# Patient Record
Sex: MALE | Race: WHITE | NOT HISPANIC OR LATINO | URBAN - METROPOLITAN AREA
[De-identification: names, ages, dates, MRNs, and addresses within clinical notes are randomized per-mention and may not be internally consistent; named-entity substitution may affect disease eponyms.]

---

## 2017-01-04 ENCOUNTER — INPATIENT (INPATIENT)
Facility: HOSPITAL | Age: 77
LOS: 5 days | Discharge: NURSING, REHAB OR LONG-TERM CARE | DRG: 123 | End: 2017-01-10
Attending: INTERNAL MEDICINE | Admitting: INTERNAL MEDICINE
Payer: MEDICARE

## 2017-01-04 VITALS
DIASTOLIC BLOOD PRESSURE: 77 MMHG | RESPIRATION RATE: 17 BRPM | TEMPERATURE: 98 F | HEART RATE: 88 BPM | SYSTOLIC BLOOD PRESSURE: 122 MMHG | OXYGEN SATURATION: 96 %

## 2017-01-04 LAB
ALBUMIN SERPL ELPH-MCNC: 3.1 G/DL — LOW (ref 3.3–5)
ALP SERPL-CCNC: 136 U/L — HIGH (ref 40–120)
ALT FLD-CCNC: 21 U/L RC — SIGNIFICANT CHANGE UP (ref 10–45)
ANION GAP SERPL CALC-SCNC: 14 MMOL/L — SIGNIFICANT CHANGE UP (ref 5–17)
APTT BLD: 34.5 SEC — SIGNIFICANT CHANGE UP (ref 27.5–37.4)
AST SERPL-CCNC: 28 U/L — SIGNIFICANT CHANGE UP (ref 10–40)
BASOPHILS # BLD AUTO: 0.1 K/UL — SIGNIFICANT CHANGE UP (ref 0–0.2)
BASOPHILS NFR BLD AUTO: 0.4 % — SIGNIFICANT CHANGE UP (ref 0–2)
BILIRUB SERPL-MCNC: 0.3 MG/DL — SIGNIFICANT CHANGE UP (ref 0.2–1.2)
BUN SERPL-MCNC: 33 MG/DL — HIGH (ref 7–23)
CALCIUM SERPL-MCNC: 8.5 MG/DL — SIGNIFICANT CHANGE UP (ref 8.4–10.5)
CHLORIDE SERPL-SCNC: 104 MMOL/L — SIGNIFICANT CHANGE UP (ref 96–108)
CO2 SERPL-SCNC: 25 MMOL/L — SIGNIFICANT CHANGE UP (ref 22–31)
CREAT SERPL-MCNC: 0.78 MG/DL — SIGNIFICANT CHANGE UP (ref 0.5–1.3)
EOSINOPHIL # BLD AUTO: 0.1 K/UL — SIGNIFICANT CHANGE UP (ref 0–0.5)
EOSINOPHIL NFR BLD AUTO: 0.4 % — SIGNIFICANT CHANGE UP (ref 0–6)
GLUCOSE SERPL-MCNC: 70 MG/DL — SIGNIFICANT CHANGE UP (ref 70–99)
HCT VFR BLD CALC: 25.6 % — LOW (ref 39–50)
HGB BLD-MCNC: 8.8 G/DL — LOW (ref 13–17)
LYMPHOCYTES # BLD AUTO: 17.5 % — SIGNIFICANT CHANGE UP (ref 13–44)
LYMPHOCYTES # BLD AUTO: 2.9 K/UL — SIGNIFICANT CHANGE UP (ref 1–3.3)
MCHC RBC-ENTMCNC: 29.2 PG — SIGNIFICANT CHANGE UP (ref 27–34)
MCHC RBC-ENTMCNC: 34.3 GM/DL — SIGNIFICANT CHANGE UP (ref 32–36)
MCV RBC AUTO: 85 FL — SIGNIFICANT CHANGE UP (ref 80–100)
MONOCYTES # BLD AUTO: 1 K/UL — HIGH (ref 0–0.9)
MONOCYTES NFR BLD AUTO: 6.1 % — SIGNIFICANT CHANGE UP (ref 2–14)
NEUTROPHILS # BLD AUTO: 12.5 K/UL — HIGH (ref 1.8–7.4)
NEUTROPHILS NFR BLD AUTO: 75.6 % — SIGNIFICANT CHANGE UP (ref 43–77)
PLATELET # BLD AUTO: 355 K/UL — SIGNIFICANT CHANGE UP (ref 150–400)
POTASSIUM SERPL-MCNC: 3.9 MMOL/L — SIGNIFICANT CHANGE UP (ref 3.5–5.3)
POTASSIUM SERPL-SCNC: 3.9 MMOL/L — SIGNIFICANT CHANGE UP (ref 3.5–5.3)
PROT SERPL-MCNC: 6.7 G/DL — SIGNIFICANT CHANGE UP (ref 6–8.3)
RBC # BLD: 3.01 M/UL — LOW (ref 4.2–5.8)
RBC # FLD: 15.2 % — HIGH (ref 10.3–14.5)
SODIUM SERPL-SCNC: 143 MMOL/L — SIGNIFICANT CHANGE UP (ref 135–145)
TROPONIN T SERPL-MCNC: <0.01 NG/ML — SIGNIFICANT CHANGE UP (ref 0–0.06)
WBC # BLD: 16.5 K/UL — HIGH (ref 3.8–10.5)
WBC # FLD AUTO: 16.5 K/UL — HIGH (ref 3.8–10.5)

## 2017-01-04 PROCEDURE — 99285 EMERGENCY DEPT VISIT HI MDM: CPT | Mod: GC

## 2017-01-04 NOTE — ED ADULT NURSE NOTE - OBJECTIVE STATEMENT
76y male c/o right eye drooping and blurry vision x 1 week. Hx of cva 30 years ago with residual left sided weakness. Patient hx gastric ulcers, had G-tube. Right PICC line. Was in rehab for dysphagia. Patient states the blurry vision has mostly cleared up but his neurologist wanted to get an MRI. No new onset of symptoms in the last week.

## 2017-01-04 NOTE — ED PROVIDER NOTE - CARE PLAN
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

## 2017-01-04 NOTE — ED PROVIDER NOTE - PMH
Acute deep vein thrombosis (DVT) of femoral vein of right lower extremity    Acute gastric ulcer with perforation    Benign prostatic hyperplasia, presence of lower urinary tract symptoms unspecified, unspecified morphology    Cerebrovascular accident (CVA), unspecified mechanism    Essential hypertension    Spinal stenosis, unspecified spinal region

## 2017-01-04 NOTE — ED PROVIDER NOTE - ATTENDING CONTRIBUTION TO CARE
76 yom pmhx recent perfed gastric ulcer months ago and [facility], presents with new right eye deviation laterally and right eyelid ptosis. no othe rweaknesses. has hx of old stroke 20 yrs ago w very mild left residual weakness. pt very well appearing, right eye deviates laterally and cannot adduct past midline the right eye. will call neuro. 76 yom pmhx recent perfed gastric ulcer with very prolonged hospital course at [facility] over last few months w severe deconditioning/inability to walk/ dysphagia w peg and just discharged relatively recently to [facility] rehab facility, presents with new right eye deviation laterally and right eyelid ptosis x5 days. no other weaknesses. has hx of old stroke 20 yrs ago w very mild left residual weakness. family states was seen by a neurologist at the rehab facility who tried few days of steroids for sxs, but sent pt in for MRI when sxs werent resolving. severity -  mod   timing - gradual    duration - x5d    location - right eye    aggravating factors - none    ROS:   constitutional - no fever, no chills  eyes - + diplopia  eent - no sore throat, no nasal congestion  cvs - no chest pain, no leg swelling  resp - no shortness of breath, no cough  gi - no abdominal pain, no vomiting, no diarrhea  gu - no dysuria, no hematuria  msk - no acute back pain, no joint swelling  skin - no rashes, no jaundice  neuro - no headache, + right eye diplopia  psych - no acute mental health issue     Physical Exam:   constitutional - well appearing, awake and alert, oriented x3  head - no external evidence of trauma  cvs - rrr, no murmurs, no peripheral edema  resp - breath sounds clear and equal bilat  gi - abdomen soft and nontender, no rigidity, guarding or rebound, bowel sounds present  msk - moving all extremities spontaneously, generalized weakness and is non ambulatory at baseline  neuro - alert and oriented x3, right eye deviated laterally and cannot adduct past midline. moderate ptosis to right eyelid. pupil reactive. no other CN deficit.  skin- no jaundice, warm and dry  psych - mood and affect wnl, no apparent risk to self or others     ? small acute stroke vs CN3 palsy in setting of likely chronic ischemic disease and underlying diabetes vs vascular lesion. seen by neuro in ED, will admit for MRI. ROSI Beavers MD

## 2017-01-04 NOTE — ED PROVIDER NOTE - PROGRESS NOTE DETAILS
Attd:  Patient signed out to me pending contact with admitting physician for CVA r/o, neurology recommending medicine admission at this time.  Admitted to hospitalist in stable condition.  Fan Casas M.D.

## 2017-01-04 NOTE — ED ADULT NURSE NOTE - ED STAT RN HANDOFF DETAILS
Report given to Jammie ORTIZ. Patient resting in stretcher in [place]. Admitted, waiting for bed. VSS

## 2017-01-05 DIAGNOSIS — E11.9 TYPE 2 DIABETES MELLITUS WITHOUT COMPLICATIONS: ICD-10-CM

## 2017-01-05 DIAGNOSIS — G52.9 CRANIAL NERVE DISORDER, UNSPECIFIED: ICD-10-CM

## 2017-01-05 DIAGNOSIS — K25.1 ACUTE GASTRIC ULCER WITH PERFORATION: ICD-10-CM

## 2017-01-05 DIAGNOSIS — Z90.89 ACQUIRED ABSENCE OF OTHER ORGANS: Chronic | ICD-10-CM

## 2017-01-05 DIAGNOSIS — R53.1 WEAKNESS: ICD-10-CM

## 2017-01-05 DIAGNOSIS — D72.829 ELEVATED WHITE BLOOD CELL COUNT, UNSPECIFIED: ICD-10-CM

## 2017-01-05 DIAGNOSIS — N40.0 BENIGN PROSTATIC HYPERPLASIA WITHOUT LOWER URINARY TRACT SYMPTOMS: ICD-10-CM

## 2017-01-05 DIAGNOSIS — I82.411 ACUTE EMBOLISM AND THROMBOSIS OF RIGHT FEMORAL VEIN: ICD-10-CM

## 2017-01-05 DIAGNOSIS — M46.20 OSTEOMYELITIS OF VERTEBRA, SITE UNSPECIFIED: ICD-10-CM

## 2017-01-05 DIAGNOSIS — K27.5 CHRONIC OR UNSPECIFIED PEPTIC ULCER, SITE UNSPECIFIED, WITH PERFORATION: Chronic | ICD-10-CM

## 2017-01-05 LAB
ANION GAP SERPL CALC-SCNC: 14 MMOL/L — SIGNIFICANT CHANGE UP (ref 5–17)
BUN SERPL-MCNC: 28 MG/DL — HIGH (ref 7–23)
CALCIUM SERPL-MCNC: 8.8 MG/DL — SIGNIFICANT CHANGE UP (ref 8.4–10.5)
CHLORIDE SERPL-SCNC: 104 MMOL/L — SIGNIFICANT CHANGE UP (ref 96–108)
CO2 SERPL-SCNC: 25 MMOL/L — SIGNIFICANT CHANGE UP (ref 22–31)
CREAT SERPL-MCNC: 0.8 MG/DL — SIGNIFICANT CHANGE UP (ref 0.5–1.3)
GLUCOSE SERPL-MCNC: 96 MG/DL — SIGNIFICANT CHANGE UP (ref 70–99)
HCT VFR BLD CALC: 28.2 % — LOW (ref 39–50)
HGB BLD-MCNC: 9.6 G/DL — LOW (ref 13–17)
INR BLD: 1.63 RATIO — HIGH (ref 0.88–1.16)
MCHC RBC-ENTMCNC: 29 PG — SIGNIFICANT CHANGE UP (ref 27–34)
MCHC RBC-ENTMCNC: 33.9 GM/DL — SIGNIFICANT CHANGE UP (ref 32–36)
MCV RBC AUTO: 85.4 FL — SIGNIFICANT CHANGE UP (ref 80–100)
PLATELET # BLD AUTO: 305 K/UL — SIGNIFICANT CHANGE UP (ref 150–400)
POTASSIUM SERPL-MCNC: 3.5 MMOL/L — SIGNIFICANT CHANGE UP (ref 3.5–5.3)
POTASSIUM SERPL-SCNC: 3.5 MMOL/L — SIGNIFICANT CHANGE UP (ref 3.5–5.3)
PROTHROM AB SERPL-ACNC: 17.8 SEC — HIGH (ref 10–13.1)
RBC # BLD: 3.31 M/UL — LOW (ref 4.2–5.8)
RBC # FLD: 15.6 % — HIGH (ref 10.3–14.5)
SODIUM SERPL-SCNC: 143 MMOL/L — SIGNIFICANT CHANGE UP (ref 135–145)
WBC # BLD: 12.8 K/UL — HIGH (ref 3.8–10.5)
WBC # FLD AUTO: 12.8 K/UL — HIGH (ref 3.8–10.5)

## 2017-01-05 PROCEDURE — 71010: CPT | Mod: 26

## 2017-01-05 PROCEDURE — 99223 1ST HOSP IP/OBS HIGH 75: CPT

## 2017-01-05 PROCEDURE — 70498 CT ANGIOGRAPHY NECK: CPT | Mod: 26

## 2017-01-05 PROCEDURE — 99222 1ST HOSP IP/OBS MODERATE 55: CPT

## 2017-01-05 PROCEDURE — 70496 CT ANGIOGRAPHY HEAD: CPT | Mod: 26

## 2017-01-05 RX ORDER — DEXTROSE 50 % IN WATER 50 %
25 SYRINGE (ML) INTRAVENOUS ONCE
Qty: 0 | Refills: 0 | Status: DISCONTINUED | OUTPATIENT
Start: 2017-01-05 | End: 2017-01-10

## 2017-01-05 RX ORDER — SIMVASTATIN 20 MG/1
20 TABLET, FILM COATED ORAL AT BEDTIME
Qty: 0 | Refills: 0 | Status: DISCONTINUED | OUTPATIENT
Start: 2017-01-05 | End: 2017-01-10

## 2017-01-05 RX ORDER — VANCOMYCIN HCL 1 G
1000 VIAL (EA) INTRAVENOUS EVERY 12 HOURS
Qty: 0 | Refills: 0 | Status: DISCONTINUED | OUTPATIENT
Start: 2017-01-05 | End: 2017-01-07

## 2017-01-05 RX ORDER — RIVAROXABAN 15 MG-20MG
20 KIT ORAL DAILY
Qty: 0 | Refills: 0 | Status: DISCONTINUED | OUTPATIENT
Start: 2017-01-05 | End: 2017-01-10

## 2017-01-05 RX ORDER — DEXTROSE 50 % IN WATER 50 %
12.5 SYRINGE (ML) INTRAVENOUS ONCE
Qty: 0 | Refills: 0 | Status: DISCONTINUED | OUTPATIENT
Start: 2017-01-05 | End: 2017-01-10

## 2017-01-05 RX ORDER — GLUCAGON INJECTION, SOLUTION 0.5 MG/.1ML
1 INJECTION, SOLUTION SUBCUTANEOUS ONCE
Qty: 0 | Refills: 0 | Status: DISCONTINUED | OUTPATIENT
Start: 2017-01-05 | End: 2017-01-10

## 2017-01-05 RX ORDER — GABAPENTIN 400 MG/1
300 CAPSULE ORAL EVERY 8 HOURS
Qty: 0 | Refills: 0 | Status: DISCONTINUED | OUTPATIENT
Start: 2017-01-05 | End: 2017-01-10

## 2017-01-05 RX ORDER — PANTOPRAZOLE SODIUM 20 MG/1
40 TABLET, DELAYED RELEASE ORAL
Qty: 0 | Refills: 0 | Status: DISCONTINUED | OUTPATIENT
Start: 2017-01-05 | End: 2017-01-10

## 2017-01-05 RX ORDER — GABAPENTIN 400 MG/1
300 CAPSULE ORAL EVERY 8 HOURS
Qty: 0 | Refills: 0 | Status: DISCONTINUED | OUTPATIENT
Start: 2017-01-05 | End: 2017-01-05

## 2017-01-05 RX ORDER — OXYCODONE HYDROCHLORIDE 5 MG/1
10 TABLET ORAL EVERY 12 HOURS
Qty: 0 | Refills: 0 | Status: DISCONTINUED | OUTPATIENT
Start: 2017-01-05 | End: 2017-01-10

## 2017-01-05 RX ORDER — SODIUM CHLORIDE 9 MG/ML
1000 INJECTION, SOLUTION INTRAVENOUS
Qty: 0 | Refills: 0 | Status: DISCONTINUED | OUTPATIENT
Start: 2017-01-05 | End: 2017-01-10

## 2017-01-05 RX ORDER — FENTANYL CITRATE 50 UG/ML
1 INJECTION INTRAVENOUS
Qty: 0 | Refills: 0 | Status: DISCONTINUED | OUTPATIENT
Start: 2017-01-05 | End: 2017-01-10

## 2017-01-05 RX ORDER — NYSTATIN CREAM 100000 [USP'U]/G
1 CREAM TOPICAL
Qty: 0 | Refills: 0 | Status: DISCONTINUED | OUTPATIENT
Start: 2017-01-05 | End: 2017-01-10

## 2017-01-05 RX ORDER — DOXAZOSIN MESYLATE 4 MG
1 TABLET ORAL AT BEDTIME
Qty: 0 | Refills: 0 | Status: DISCONTINUED | OUTPATIENT
Start: 2017-01-05 | End: 2017-01-10

## 2017-01-05 RX ORDER — MEROPENEM 1 G/30ML
2000 INJECTION INTRAVENOUS EVERY 12 HOURS
Qty: 0 | Refills: 0 | Status: DISCONTINUED | OUTPATIENT
Start: 2017-01-05 | End: 2017-01-10

## 2017-01-05 RX ORDER — TAMSULOSIN HYDROCHLORIDE 0.4 MG/1
0.4 CAPSULE ORAL AT BEDTIME
Qty: 0 | Refills: 0 | Status: DISCONTINUED | OUTPATIENT
Start: 2017-01-05 | End: 2017-01-05

## 2017-01-05 RX ORDER — PANTOPRAZOLE SODIUM 20 MG/1
40 TABLET, DELAYED RELEASE ORAL
Qty: 0 | Refills: 0 | Status: DISCONTINUED | OUTPATIENT
Start: 2017-01-05 | End: 2017-01-05

## 2017-01-05 RX ORDER — ACETAMINOPHEN 500 MG
650 TABLET ORAL EVERY 6 HOURS
Qty: 0 | Refills: 0 | Status: DISCONTINUED | OUTPATIENT
Start: 2017-01-05 | End: 2017-01-10

## 2017-01-05 RX ORDER — ONDANSETRON 8 MG/1
4 TABLET, FILM COATED ORAL EVERY 6 HOURS
Qty: 0 | Refills: 0 | Status: DISCONTINUED | OUTPATIENT
Start: 2017-01-05 | End: 2017-01-10

## 2017-01-05 RX ORDER — INSULIN LISPRO 100/ML
VIAL (ML) SUBCUTANEOUS AT BEDTIME
Qty: 0 | Refills: 0 | Status: DISCONTINUED | OUTPATIENT
Start: 2017-01-05 | End: 2017-01-10

## 2017-01-05 RX ORDER — INSULIN LISPRO 100/ML
VIAL (ML) SUBCUTANEOUS
Qty: 0 | Refills: 0 | Status: DISCONTINUED | OUTPATIENT
Start: 2017-01-05 | End: 2017-01-10

## 2017-01-05 RX ORDER — TAMSULOSIN HYDROCHLORIDE 0.4 MG/1
1 CAPSULE ORAL
Qty: 0 | Refills: 0 | COMMUNITY

## 2017-01-05 RX ORDER — DEXTROSE 50 % IN WATER 50 %
1 SYRINGE (ML) INTRAVENOUS ONCE
Qty: 0 | Refills: 0 | Status: DISCONTINUED | OUTPATIENT
Start: 2017-01-05 | End: 2017-01-10

## 2017-01-05 RX ADMIN — OXYCODONE HYDROCHLORIDE 10 MILLIGRAM(S): 5 TABLET ORAL at 16:43

## 2017-01-05 RX ADMIN — NYSTATIN CREAM 1 APPLICATION(S): 100000 CREAM TOPICAL at 22:07

## 2017-01-05 RX ADMIN — SIMVASTATIN 20 MILLIGRAM(S): 20 TABLET, FILM COATED ORAL at 22:08

## 2017-01-05 RX ADMIN — RIVAROXABAN 20 MILLIGRAM(S): KIT at 16:43

## 2017-01-05 RX ADMIN — MEROPENEM 200 MILLIGRAM(S): 1 INJECTION INTRAVENOUS at 22:06

## 2017-01-05 RX ADMIN — Medication 250 MILLIGRAM(S): at 22:07

## 2017-01-05 RX ADMIN — Medication 1 MILLIGRAM(S): at 22:08

## 2017-01-05 RX ADMIN — GABAPENTIN 300 MILLIGRAM(S): 400 CAPSULE ORAL at 22:07

## 2017-01-05 RX ADMIN — PANTOPRAZOLE SODIUM 40 MILLIGRAM(S): 20 TABLET, DELAYED RELEASE ORAL at 16:43

## 2017-01-05 NOTE — H&P ADULT. - FAMILY HISTORY
Sibling  Still living? Unknown  Family history of ischemic heart disease, Age at diagnosis: Age Unknown

## 2017-01-05 NOTE — PATIENT PROFILE ADULT. - VISION (WITH CORRECTIVE LENSES IF THE PATIENT USUALLY WEARS THEM):
Normal vision: sees adequately in most situations; can see medication labels, newsprint Partially impaired: cannot see medication labels or newsprint, but can see obstacles in path, and the surrounding layout; can count fingers at arm's length

## 2017-01-05 NOTE — H&P ADULT. - PROBLEM SELECTOR PROBLEM 4
Benign prostatic hyperplasia, presence of lower urinary tract symptoms unspecified, unspecified morphology

## 2017-01-05 NOTE — H&P ADULT. - PROBLEM SELECTOR PLAN 7
h/o osteo at [place] on IV abx  -appreciate ID consult  -repeat MRI T/L spine to better evaluate   -continue vanc/grey via picc

## 2017-01-05 NOTE — H&P ADULT. - ASSESSMENT
Mr. Leger is a 75 y/o m with PMH DM II, HTN, spinal stenosis, CVA 20 years ago with mild residual L sided weakness, recent complicated admission at [place] for perforated gastric ulcer c/b PEG tube placement, RLE DVT, osteomyelitis/diskitis on vanc/grey via PICC sent in from rehab for R lid ptosis and 3rd nerve palsy.

## 2017-01-05 NOTE — H&P ADULT. - HISTORY OF PRESENT ILLNESS
Mr. Leger is a 77 y/o m with PMH DM II, HTN, spinal stenosis, CVA 20 years ago with mild residual L sided weakness, recent complicated admission at [place] for perforated gastric ulcer c/b PEG tube placement, RLE DVT, osteomyelitis/diskitis on vanc/grey via PICC sent in from rehab for R lid ptosis and lateral gaze deviation.  Per patient for the past week he has noticed his R eye hasn't been opening.  He also initially had some decreased visual acuity and horizontal double vision which has been improving.  No headaches, pain with eye movements, no changes in speech, changes in strength.  Seen by neurologist at rehab, treated with medrol pack 12/29-1/4 without improvement sent to hospital for MRI.  Patient denies any fevers, chills, nausea, vomiting, diarrhea, dysuria, cough, chest pain, sick contacts.  Walked with a walker prior to admission to OSH but since hospitalization has been wheelchair bound and unable to walk due to leg weakness.

## 2017-01-05 NOTE — ED ADULT NURSE REASSESSMENT NOTE - NS ED NURSE REASSESS COMMENT FT1
Received pt asleep in bed but arousable, pt admitted, pt informed. safety and comfort provided, pt alert, ox3, denies complaints, inquiring about his diet, pt has peg tube in situ. Pick line intact on the right medial upper arm, right eye CHCF closed. moves all extremities, weakness on the left leg.

## 2017-01-05 NOTE — PATIENT PROFILE ADULT. - NUTRITION PROFILE
enteral or parenteral nutrition prior to admission enteral or parenteral nutrition prior to admission/pressure ulcer Stage 2 or greater

## 2017-01-05 NOTE — H&P ADULT. - PROBLEM SELECTOR PLAN 2
likely 2/2 recent steroid administration   appreciate ID consult  continue vanc/grey via PICC for osteo

## 2017-01-06 LAB
ANION GAP SERPL CALC-SCNC: 17 MMOL/L — SIGNIFICANT CHANGE UP (ref 5–17)
BASOPHILS # BLD AUTO: 0.03 K/UL — SIGNIFICANT CHANGE UP (ref 0–0.2)
BASOPHILS NFR BLD AUTO: 0.3 % — SIGNIFICANT CHANGE UP (ref 0–2)
BUN SERPL-MCNC: 33 MG/DL — HIGH (ref 7–23)
CALCIUM SERPL-MCNC: 8.8 MG/DL — SIGNIFICANT CHANGE UP (ref 8.4–10.5)
CHLORIDE SERPL-SCNC: 106 MMOL/L — SIGNIFICANT CHANGE UP (ref 96–108)
CHOLEST SERPL-MCNC: 142 MG/DL — SIGNIFICANT CHANGE UP (ref 10–199)
CO2 SERPL-SCNC: 28 MMOL/L — SIGNIFICANT CHANGE UP (ref 22–31)
CREAT SERPL-MCNC: 0.95 MG/DL — SIGNIFICANT CHANGE UP (ref 0.5–1.3)
EOSINOPHIL # BLD AUTO: 0.17 K/UL — SIGNIFICANT CHANGE UP (ref 0–0.5)
EOSINOPHIL NFR BLD AUTO: 1.8 % — SIGNIFICANT CHANGE UP (ref 0–6)
GLUCOSE SERPL-MCNC: 102 MG/DL — HIGH (ref 70–99)
HBA1C BLD-MCNC: 5.6 % — SIGNIFICANT CHANGE UP (ref 4–5.6)
HCT VFR BLD CALC: 28.3 % — LOW (ref 39–50)
HDLC SERPL-MCNC: 45 MG/DL — SIGNIFICANT CHANGE UP (ref 40–125)
HGB BLD-MCNC: 8.8 G/DL — LOW (ref 13–17)
IMM GRANULOCYTES NFR BLD AUTO: 0.7 % — SIGNIFICANT CHANGE UP (ref 0–1.5)
LIPID PNL WITH DIRECT LDL SERPL: 85 MG/DL — SIGNIFICANT CHANGE UP
LYMPHOCYTES # BLD AUTO: 1.6 K/UL — SIGNIFICANT CHANGE UP (ref 1–3.3)
LYMPHOCYTES # BLD AUTO: 16.7 % — SIGNIFICANT CHANGE UP (ref 13–44)
MAGNESIUM SERPL-MCNC: 2.5 MG/DL — SIGNIFICANT CHANGE UP (ref 1.6–2.6)
MCHC RBC-ENTMCNC: 26.9 PG — LOW (ref 27–34)
MCHC RBC-ENTMCNC: 31.1 GM/DL — LOW (ref 32–36)
MCV RBC AUTO: 86.5 FL — SIGNIFICANT CHANGE UP (ref 80–100)
MONOCYTES # BLD AUTO: 0.94 K/UL — HIGH (ref 0–0.9)
MONOCYTES NFR BLD AUTO: 9.8 % — SIGNIFICANT CHANGE UP (ref 2–14)
NEUTROPHILS # BLD AUTO: 6.77 K/UL — SIGNIFICANT CHANGE UP (ref 1.8–7.4)
NEUTROPHILS NFR BLD AUTO: 70.7 % — SIGNIFICANT CHANGE UP (ref 43–77)
PHOSPHATE SERPL-MCNC: 5.8 MG/DL — HIGH (ref 2.5–4.5)
PLATELET # BLD AUTO: 308 K/UL — SIGNIFICANT CHANGE UP (ref 150–400)
POTASSIUM SERPL-MCNC: 4 MMOL/L — SIGNIFICANT CHANGE UP (ref 3.5–5.3)
POTASSIUM SERPL-SCNC: 4 MMOL/L — SIGNIFICANT CHANGE UP (ref 3.5–5.3)
RBC # BLD: 3.27 M/UL — LOW (ref 4.2–5.8)
RBC # FLD: 17.2 % — HIGH (ref 10.3–14.5)
SODIUM SERPL-SCNC: 151 MMOL/L — HIGH (ref 135–145)
TOTAL CHOLESTEROL/HDL RATIO MEASUREMENT: 3.2 RATIO — LOW (ref 3.4–9.6)
TRIGL SERPL-MCNC: 60 MG/DL — SIGNIFICANT CHANGE UP (ref 10–149)
TSH SERPL-MCNC: 3.14 UU/ML — SIGNIFICANT CHANGE UP (ref 0.27–4.2)
WBC # BLD: 9.58 K/UL — SIGNIFICANT CHANGE UP (ref 3.8–10.5)
WBC # FLD AUTO: 9.58 K/UL — SIGNIFICANT CHANGE UP (ref 3.8–10.5)

## 2017-01-06 PROCEDURE — 72147 MRI CHEST SPINE W/DYE: CPT | Mod: 26

## 2017-01-06 PROCEDURE — 99232 SBSQ HOSP IP/OBS MODERATE 35: CPT

## 2017-01-06 PROCEDURE — 70553 MRI BRAIN STEM W/O & W/DYE: CPT | Mod: 26

## 2017-01-06 PROCEDURE — 72149 MRI LUMBAR SPINE W/DYE: CPT | Mod: 26

## 2017-01-06 RX ORDER — ASCORBIC ACID 60 MG
500 TABLET,CHEWABLE ORAL
Qty: 0 | Refills: 0 | Status: DISCONTINUED | OUTPATIENT
Start: 2017-01-06 | End: 2017-01-06

## 2017-01-06 RX ORDER — ASCORBIC ACID 60 MG
500 TABLET,CHEWABLE ORAL DAILY
Qty: 0 | Refills: 0 | Status: DISCONTINUED | OUTPATIENT
Start: 2017-01-06 | End: 2017-01-10

## 2017-01-06 RX ORDER — ALTEPLASE 100 MG
2 KIT INTRAVENOUS ONCE
Qty: 0 | Refills: 0 | Status: COMPLETED | OUTPATIENT
Start: 2017-01-06 | End: 2017-01-06

## 2017-01-06 RX ADMIN — GABAPENTIN 300 MILLIGRAM(S): 400 CAPSULE ORAL at 06:10

## 2017-01-06 RX ADMIN — RIVAROXABAN 20 MILLIGRAM(S): KIT at 11:38

## 2017-01-06 RX ADMIN — Medication 250 MILLIGRAM(S): at 21:19

## 2017-01-06 RX ADMIN — NYSTATIN CREAM 1 APPLICATION(S): 100000 CREAM TOPICAL at 17:19

## 2017-01-06 RX ADMIN — Medication 1 MILLIGRAM(S): at 21:19

## 2017-01-06 RX ADMIN — NYSTATIN CREAM 1 APPLICATION(S): 100000 CREAM TOPICAL at 06:09

## 2017-01-06 RX ADMIN — OXYCODONE HYDROCHLORIDE 10 MILLIGRAM(S): 5 TABLET ORAL at 22:48

## 2017-01-06 RX ADMIN — GABAPENTIN 300 MILLIGRAM(S): 400 CAPSULE ORAL at 16:31

## 2017-01-06 RX ADMIN — ALTEPLASE 2 MILLIGRAM(S): KIT at 11:13

## 2017-01-06 RX ADMIN — PANTOPRAZOLE SODIUM 40 MILLIGRAM(S): 20 TABLET, DELAYED RELEASE ORAL at 06:09

## 2017-01-06 RX ADMIN — Medication 500 MILLIGRAM(S): at 22:45

## 2017-01-06 RX ADMIN — MEROPENEM 200 MILLIGRAM(S): 1 INJECTION INTRAVENOUS at 11:34

## 2017-01-06 RX ADMIN — Medication 250 MILLIGRAM(S): at 11:37

## 2017-01-06 RX ADMIN — MEROPENEM 200 MILLIGRAM(S): 1 INJECTION INTRAVENOUS at 22:46

## 2017-01-06 RX ADMIN — OXYCODONE HYDROCHLORIDE 10 MILLIGRAM(S): 5 TABLET ORAL at 11:37

## 2017-01-06 RX ADMIN — OXYCODONE HYDROCHLORIDE 10 MILLIGRAM(S): 5 TABLET ORAL at 23:48

## 2017-01-06 RX ADMIN — GABAPENTIN 300 MILLIGRAM(S): 400 CAPSULE ORAL at 22:49

## 2017-01-06 RX ADMIN — Medication 1: at 08:16

## 2017-01-06 RX ADMIN — OXYCODONE HYDROCHLORIDE 10 MILLIGRAM(S): 5 TABLET ORAL at 12:07

## 2017-01-06 RX ADMIN — FENTANYL CITRATE 1 PATCH: 50 INJECTION INTRAVENOUS at 18:41

## 2017-01-06 RX ADMIN — SIMVASTATIN 20 MILLIGRAM(S): 20 TABLET, FILM COATED ORAL at 21:19

## 2017-01-06 NOTE — DIETITIAN INITIAL EVALUATION ADULT. - PHYSICAL APPEARANCE
underweight/Nutrition physical focused exam: Severe fat.muscle wasting at temporals, clavicles, deltoids, scapulas, ribs, triceps, calfs.

## 2017-01-06 NOTE — DISCHARGE NOTE ADULT - PROVIDER TOKENS
LEONEL:'[id number]',LEONEL:'[id number]' TOKEN:'[id number]:'[id number]:'[id number]'

## 2017-01-06 NOTE — DISCHARGE NOTE ADULT - CARE PLAN
Principal Discharge DX:	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, right  Goal:	R cranial nerve palsy improves  Instructions for follow-up, activity and diet:	possibly ischemic  Follow up with Neurology and Ophthalmology  Secondary Diagnosis:	Discitis of lumbar region  Instructions for follow-up, activity and diet:	complete antibiiotics until 1/16/17  Follow up with neurosurgery  Follow up with ID specilaist Dr Lutz  Secondary Diagnosis:	Dysphagia  Instructions for follow-up, activity and diet:	NPO with therapeutic trials of honey thick liquids by speech /swallow therapist  Secondary Diagnosis:	Hypernatremia  Instructions for follow-up, activity and diet:	Continue Free water 250 cc via PEG q6 hourly  check BMP in 2 days  Secondary Diagnosis:	Scrotal hernia  Instructions for follow-up, activity and diet:	Follow up with Urologist for hernia repair  Secondary Diagnosis:	Pressure ulcer, stage II  Instructions for follow-up, activity and diet:	sacral stg 2 pressure ulcer, continue barrier cream  Secondary Diagnosis:	PEG (percutaneous endoscopic gastrostomy) status  Instructions for follow-up, activity and diet:	continue PEG feeds Principal Discharge DX:	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, right  Goal:	R cranial nerve palsy improves  Instructions for follow-up, activity and diet:	possibly ischemic  Follow up with Neurology and Ophthalmology  Secondary Diagnosis:	Discitis of lumbar region  Instructions for follow-up, activity and diet:	complete antibiotics until 1/16/17  Follow up with neurosurgery  Follow up with ID specilaist Dr Lutz  Secondary Diagnosis:	Dysphagia  Instructions for follow-up, activity and diet:	NPO with therapeutic trials of honey thick liquids by speech /swallow therapist  Secondary Diagnosis:	Hypernatremia  Instructions for follow-up, activity and diet:	Continue Free water 250 cc via PEG q6 hourly  check BMP in 2 days  Secondary Diagnosis:	Scrotal hernia  Instructions for follow-up, activity and diet:	Follow up with Urologist for hernia repair  Secondary Diagnosis:	Pressure ulcer, stage II  Instructions for follow-up, activity and diet:	sacral stg 2 pressure ulcer, continue barrier cream  Secondary Diagnosis:	PEG (percutaneous endoscopic gastrostomy) status  Instructions for follow-up, activity and diet:	continue PEG feeds

## 2017-01-06 NOTE — DISCHARGE NOTE ADULT - CARE PROVIDERS DIRECT ADDRESSES
,DirectAddress_Unknown,[email or web address],DirectAddress_Unknown ,DirectAddress_Unknown,[email or web address],DirectAddress_Unknown

## 2017-01-06 NOTE — DIETITIAN INITIAL EVALUATION ADULT. - OTHER INFO
Nutrition consult received for tube feeding. Pt reports tolerating Jevity 1.5 @85ml/al62jwr well while at rehab. Pt denies GI distress, states last BM was a few days ago. Pt is requesting to have a similar feeding schedule as he folllows a rehab. Pt denies any history of DM, however per chart Pt has history of DM, finger sticks are monitored 3xdaiyl at rehab. will discuss with NP. Pt takes Vit C, MVI, and Vit D PTA. NKFA

## 2017-01-06 NOTE — DISCHARGE NOTE ADULT - NS AS DC FOLLOWUP STROKE INST
Influenza vaccination (VIS Pub Date: August 19, 2014)/Smoking Cessation/Stroke (includes: TIA/SAH/ICH/Ischemic Stroke) Influenza vaccination (VIS Pub Date: August 19, 2014)/Smoking Cessation Coumadin/Warfarin

## 2017-01-06 NOTE — DISCHARGE NOTE ADULT - PATIENT PORTAL LINK FT
“You can access the FollowHealth Patient Portal, offered by [facility], by registering with the following website: [email or web address]”

## 2017-01-06 NOTE — DIETITIAN INITIAL EVALUATION ADULT. - NS AS NUTRI INTERV ENTERAL NUTRITION
Rate/Volume/Recommend change EN to Jevity 1.2 @85ml/coe91unr starting at 4pm. Goal rate provides: 1836kcals and 84gm protein (34.2kcals/kg and 1.58gm pro./kg; based on stated Wt: 53.5kg) Discussed with NP.

## 2017-01-06 NOTE — DISCHARGE NOTE ADULT - MEDICATION SUMMARY - MEDICATIONS TO TAKE
I will START or STAY ON the medications listed below when I get home from the hospital:    fentaNYL 12 mcg/hr transdermal film, extended release  -- 1 patch by transdermal patch every 72 hours  -- Indication: For Pain    oxyCODONE 10 mg oral tablet  -- 1 tab(s) by mouth every 12 hours, As needed, pain  -- Indication: For Pain    Flomax 0.4 mg oral capsule  -- 1 cap(s) by mouth once a day  -- Indication: For BPH    rivaroxaban 20 mg oral tablet  -- 1 tab(s) by mouth once a day  -- Indication: For Deep venous thrombosis    gabapentin 250 mg/5 mL oral solution  -- 6 milliliter(s) by mouth every 8 hours  -- Indication: For Pain    insulin lispro 100 units/mL subcutaneous solution  --  subcutaneous once a day (at bedtime); 0 Unit(s) if Glucose 0 - 250  1 Unit(s) if Glucose 251 - 300  2 Unit(s) if Glucose 301 - 350  3 Unit(s) if Glucose 351 - 400  4 Unit(s) if Glucose Greater Than 400  -- Indication: For Diabetes    insulin lispro 100 units/mL subcutaneous solution  --  subcutaneous 3 times a day (before meals); 1 Unit(s) if Glucose 151 - 200  2 Unit(s) if Glucose 201 - 250  3 Unit(s) if Glucose 251 - 300  4 Unit(s) if Glucose 301 - 350  5 Unit(s) if Glucose 351 - 400  6 Unit(s) if Glucose Greater Than 400  -- Indication: For Diabetes    simvastatin 20 mg oral tablet  -- 1 tab(s) by mouth once a day (at bedtime)  -- Indication: For Hyperlipidemia    meropenem 1000 mg intravenous injection  -- 2000 milligram(s) intravenous every 12 hours  until 1/16/17  -- Indication: For Discitis of lumbar region    nystatin 100,000 units/g topical powder  -- 1 application on skin 2 times a day  -- Indication: For Dermatitis    vancomycin 750 mg intravenous injection  -- 750 milligram(s) intravenous every 12 hours  until 1/16/17  -- Indication: For Discitis of lumbar region    PriLOSEC 40 mg oral delayed release capsule  -- 1 cap(s) by gastrostomy tube once a day  -- Indication: For Gerd    ascorbic acid 500 mg oral tablet  -- 1 tab(s) by gastrostomy tube once a day  -- Indication: For Supplement

## 2017-01-06 NOTE — DIETITIAN INITIAL EVALUATION ADULT. - PROBLEM SELECTOR PLAN 7
h/o osteo at [place] on IV abx  -appreciate ID consult  -repeat MRI T/L spine to better evaluate   -continue vanc/grey via picc

## 2017-01-06 NOTE — DISCHARGE NOTE ADULT - PLAN OF CARE
R cranial nerve palsy improves possibly ischemic  Follow up with Neurology and Ophthalmology complete antibiiotics until 1/16/17  Follow up with neurosurgery  Follow up with ID specilaist Dr Lutz NPO with therapeutic trials of honey thick liquids by speech /swallow therapist Continue Free water 250 cc via PEG q6 hourly  check BMP in 2 days Follow up with Urologist for hernia repair sacral stg 2 pressure ulcer, continue barrier cream continue PEG feeds complete antibiotics until 1/16/17  Follow up with neurosurgery  Follow up with ID specilaist Dr Lutz

## 2017-01-06 NOTE — DIETITIAN INITIAL EVALUATION ADULT. - ENERGY NEEDS
Ht: 5'2", Wt:118lbs (stated, as no recorded Wt), BMI: 21.5kg/m2, IBW: 1118lbs(+/-10%), 100 %IBW  Pertinent information: Pt admitted from rehab  for AMS, with 3rd never pesly. S/p CT Head and Brain MRI; pending results. Pt with history of OM, PEG and perforated gastric ulcer.? history of DM. Pending swallow eval.    No Edema. Stage 2 sacral pressure ulcer

## 2017-01-06 NOTE — DIETITIAN INITIAL EVALUATION ADULT. - NS AS NUTRI INTERV COLLABORAT
Collaboration with other nutrition professionals/Please check Hgba1c to determine Pt's DM status. Malnutrition sticker placed in chart. RD remains available to monitor Ed tolerance, wt labs.

## 2017-01-06 NOTE — DISCHARGE NOTE ADULT - NS AS DC STROKE ED MATERIALS
Call 911 for Stroke/Risk Factors for Stroke/Need for Followup After Discharge/Stroke Education Booklet/Prescribed Medications/Stroke Warning Signs and Symptoms

## 2017-01-06 NOTE — DISCHARGE NOTE ADULT - MEDICATION SUMMARY - MEDICATIONS TO STOP TAKING
I will STOP taking the medications listed below when I get home from the hospital:    Tylenol 325 mg oral tablet  -- 1 tab(s) by mouth every 12 hours

## 2017-01-06 NOTE — DISCHARGE NOTE ADULT - ADDITIONAL INSTRUCTIONS
PICC line can be pulled out once antibiotics completed;   Follow up with Infectious disease specialist at [place] Dr Lutz after completion of Antibiotics  Follow up with Urologist for hernia repair  Follow up with Neurosurgery for Discits of lumbar spine PICC line can be pulled out once antibiotics completed;   Follow up with Infectious disease specialist at [place] Dr Lutz after completion of Antibiotics  Follow up with Urologist for hernia repair  Follow up with Neurosurgery for Discits of lumbar spine  Follow up with Opthamologist for double vision R eye Check BMP in 2 days to evaluate hypernatremia; check vancomycin trough in 2 days  PICC line can be pulled out once antibiotics completed;   Follow up with Infectious disease specialist at [place] Dr Lutz after completion of Antibiotics  Follow up with Urologist for hernia repair  Follow up with Neurosurgery for Discits of lumbar spine  Follow up with Opthamologist for double vision R eye

## 2017-01-06 NOTE — DISCHARGE NOTE ADULT - CARE PROVIDER_API CALL
James Wallace (DO), Neurology  [address] [place] [address]  Phone: [phone]  Fax: [phone]    Kim Last (DO), Neurological Surgery  [address] [place] [address]  Phone: [phone]  Fax: [phone] James Wallace (DO), Neurology  [address] [place] [address]  Phone: [phone]  Fax: [phone]    Kim Last (), Neurological Surgery  [address] [place] [address]  Phone: [phone]  Fax: [phone]    Wayne Reynolds (MD), Ophthalmology  [address] [place] [address]  Phone: [phone]  Fax: [phone]

## 2017-01-06 NOTE — DISCHARGE NOTE ADULT - SECONDARY DIAGNOSIS.
Discitis of lumbar region Dysphagia Hypernatremia Scrotal hernia Pressure ulcer, stage II PEG (percutaneous endoscopic gastrostomy) status

## 2017-01-07 LAB
ANION GAP SERPL CALC-SCNC: 16 MMOL/L — SIGNIFICANT CHANGE UP (ref 5–17)
BUN SERPL-MCNC: 27 MG/DL — HIGH (ref 7–23)
CALCIUM SERPL-MCNC: 8.8 MG/DL — SIGNIFICANT CHANGE UP (ref 8.4–10.5)
CHLORIDE SERPL-SCNC: 107 MMOL/L — SIGNIFICANT CHANGE UP (ref 96–108)
CO2 SERPL-SCNC: 25 MMOL/L — SIGNIFICANT CHANGE UP (ref 22–31)
CREAT SERPL-MCNC: 0.93 MG/DL — SIGNIFICANT CHANGE UP (ref 0.5–1.3)
CRP SERPL-MCNC: 0.44 MG/DL — HIGH (ref 0–0.4)
ERYTHROCYTE [SEDIMENTATION RATE] IN BLOOD: 27 MM/HR — HIGH (ref 0–20)
GLUCOSE SERPL-MCNC: 101 MG/DL — HIGH (ref 70–99)
HBA1C BLD-MCNC: 5.3 % — SIGNIFICANT CHANGE UP (ref 4–5.6)
HCT VFR BLD CALC: 26.4 % — LOW (ref 39–50)
HGB BLD-MCNC: 8.2 G/DL — LOW (ref 13–17)
MCHC RBC-ENTMCNC: 27 PG — SIGNIFICANT CHANGE UP (ref 27–34)
MCHC RBC-ENTMCNC: 31.1 GM/DL — LOW (ref 32–36)
MCV RBC AUTO: 86.8 FL — SIGNIFICANT CHANGE UP (ref 80–100)
PLATELET # BLD AUTO: 311 K/UL — SIGNIFICANT CHANGE UP (ref 150–400)
POTASSIUM SERPL-MCNC: 3.5 MMOL/L — SIGNIFICANT CHANGE UP (ref 3.5–5.3)
POTASSIUM SERPL-SCNC: 3.5 MMOL/L — SIGNIFICANT CHANGE UP (ref 3.5–5.3)
RBC # BLD: 3.04 M/UL — LOW (ref 4.2–5.8)
RBC # FLD: 17.1 % — HIGH (ref 10.3–14.5)
SODIUM SERPL-SCNC: 148 MMOL/L — HIGH (ref 135–145)
VANCOMYCIN TROUGH SERPL-MCNC: 26.6 UG/ML — CRITICAL HIGH (ref 10–20)
WBC # BLD: 7.08 K/UL — SIGNIFICANT CHANGE UP (ref 3.8–10.5)
WBC # FLD AUTO: 7.08 K/UL — SIGNIFICANT CHANGE UP (ref 3.8–10.5)

## 2017-01-07 RX ORDER — POTASSIUM CHLORIDE 20 MEQ
10 PACKET (EA) ORAL ONCE
Qty: 0 | Refills: 0 | Status: COMPLETED | OUTPATIENT
Start: 2017-01-07 | End: 2017-01-07

## 2017-01-07 RX ORDER — VANCOMYCIN HCL 1 G
750 VIAL (EA) INTRAVENOUS EVERY 12 HOURS
Qty: 0 | Refills: 0 | Status: DISCONTINUED | OUTPATIENT
Start: 2017-01-08 | End: 2017-01-10

## 2017-01-07 RX ADMIN — Medication 250 MILLIGRAM(S): at 09:29

## 2017-01-07 RX ADMIN — Medication 500 MILLIGRAM(S): at 11:19

## 2017-01-07 RX ADMIN — NYSTATIN CREAM 1 APPLICATION(S): 100000 CREAM TOPICAL at 17:20

## 2017-01-07 RX ADMIN — Medication 650 MILLIGRAM(S): at 21:26

## 2017-01-07 RX ADMIN — GABAPENTIN 300 MILLIGRAM(S): 400 CAPSULE ORAL at 05:37

## 2017-01-07 RX ADMIN — SIMVASTATIN 20 MILLIGRAM(S): 20 TABLET, FILM COATED ORAL at 21:25

## 2017-01-07 RX ADMIN — GABAPENTIN 300 MILLIGRAM(S): 400 CAPSULE ORAL at 13:02

## 2017-01-07 RX ADMIN — RIVAROXABAN 20 MILLIGRAM(S): KIT at 11:19

## 2017-01-07 RX ADMIN — Medication 100 MILLIEQUIVALENT(S): at 16:35

## 2017-01-07 RX ADMIN — GABAPENTIN 300 MILLIGRAM(S): 400 CAPSULE ORAL at 21:59

## 2017-01-07 RX ADMIN — PANTOPRAZOLE SODIUM 40 MILLIGRAM(S): 20 TABLET, DELAYED RELEASE ORAL at 05:36

## 2017-01-07 RX ADMIN — Medication 1 MILLIGRAM(S): at 21:25

## 2017-01-07 RX ADMIN — OXYCODONE HYDROCHLORIDE 10 MILLIGRAM(S): 5 TABLET ORAL at 11:51

## 2017-01-07 RX ADMIN — MEROPENEM 200 MILLIGRAM(S): 1 INJECTION INTRAVENOUS at 21:59

## 2017-01-07 RX ADMIN — MEROPENEM 200 MILLIGRAM(S): 1 INJECTION INTRAVENOUS at 09:29

## 2017-01-07 RX ADMIN — Medication 650 MILLIGRAM(S): at 22:09

## 2017-01-07 RX ADMIN — NYSTATIN CREAM 1 APPLICATION(S): 100000 CREAM TOPICAL at 05:36

## 2017-01-07 RX ADMIN — OXYCODONE HYDROCHLORIDE 10 MILLIGRAM(S): 5 TABLET ORAL at 11:21

## 2017-01-07 RX ADMIN — Medication 1: at 08:23

## 2017-01-07 NOTE — PHYSICAL THERAPY INITIAL EVALUATION ADULT - RANGE OF MOTION EXAMINATION, REHAB EVAL
deficits as listed below/Dorsiflexion limited on LLE 1/4 ROM. LLE limited to 1/2 available ROM. LUE limited to 3/4 ROM

## 2017-01-07 NOTE — PHYSICAL THERAPY INITIAL EVALUATION ADULT - CRITERIA FOR SKILLED THERAPEUTIC INTERVENTIONS
anticipated discharge recommendation/therapy frequency/impairments found/rehab potential/anticipated equipment needs at discharge/functional limitations in following categories/risk reduction/prevention/predicted duration of therapy intervention

## 2017-01-07 NOTE — PHYSICAL THERAPY INITIAL EVALUATION ADULT - IMPAIRMENTS CONTRIBUTING TO GAIT DEVIATIONS, PT EVAL
decreased strength/impaired postural control/decreased flexibility/impaired motor control/impaired balance

## 2017-01-07 NOTE — PHYSICAL THERAPY INITIAL EVALUATION ADULT - PERTINENT HX OF CURRENT PROBLEM, REHAB EVAL
Mr. Leger is a 77 y/o m with PMH DM II, HTN, spinal stenosis, CVA 20 years ago with mild residual L sided weakness, perforated gastric ulcer c/b PEG tube placement, RLE DVT on Xarelto, osteomyelitis/diskitis on vanc/grey via PICC sent in from rehab for R lid ptosis and lateral gaze deviation. DX-  New R 3rd cranial nerve palsy

## 2017-01-07 NOTE — PHYSICAL THERAPY INITIAL EVALUATION ADULT - IMPAIRMENTS FOUND, PT EVAL
gait, locomotion, and balance/anthropometric characteristics/aerobic capacity/endurance/muscle strength

## 2017-01-07 NOTE — PHYSICAL THERAPY INITIAL EVALUATION ADULT - ADDITIONAL COMMENTS
this is a 76 year old male who was admitted from rehab, Prior to rehab, Pt was living in a private house in [place], few steps to enter, + hand rail,  where pt was independent with all ADL's and mobility and was careing for himself without any home health aide assistance, admitted to having a friend help out with shopping.

## 2017-01-08 LAB
ANION GAP SERPL CALC-SCNC: 16 MMOL/L — SIGNIFICANT CHANGE UP (ref 5–17)
BUN SERPL-MCNC: 29 MG/DL — HIGH (ref 7–23)
CALCIUM SERPL-MCNC: 8.6 MG/DL — SIGNIFICANT CHANGE UP (ref 8.4–10.5)
CHLORIDE SERPL-SCNC: 108 MMOL/L — SIGNIFICANT CHANGE UP (ref 96–108)
CO2 SERPL-SCNC: 28 MMOL/L — SIGNIFICANT CHANGE UP (ref 22–31)
CREAT SERPL-MCNC: 0.92 MG/DL — SIGNIFICANT CHANGE UP (ref 0.5–1.3)
GLUCOSE SERPL-MCNC: 114 MG/DL — HIGH (ref 70–99)
HCT VFR BLD CALC: 26.7 % — LOW (ref 39–50)
HGB BLD-MCNC: 7.9 G/DL — LOW (ref 13–17)
MCHC RBC-ENTMCNC: 26.3 PG — LOW (ref 27–34)
MCHC RBC-ENTMCNC: 29.6 GM/DL — LOW (ref 32–36)
MCV RBC AUTO: 89 FL — SIGNIFICANT CHANGE UP (ref 80–100)
PLATELET # BLD AUTO: 304 K/UL — SIGNIFICANT CHANGE UP (ref 150–400)
POTASSIUM SERPL-MCNC: 3.9 MMOL/L — SIGNIFICANT CHANGE UP (ref 3.5–5.3)
POTASSIUM SERPL-SCNC: 3.9 MMOL/L — SIGNIFICANT CHANGE UP (ref 3.5–5.3)
RBC # BLD: 3 M/UL — LOW (ref 4.2–5.8)
RBC # FLD: 17.5 % — HIGH (ref 10.3–14.5)
SODIUM SERPL-SCNC: 152 MMOL/L — HIGH (ref 135–145)
VANCOMYCIN TROUGH SERPL-MCNC: 20.8 UG/ML — HIGH (ref 10–20)
WBC # BLD: 7.19 K/UL — SIGNIFICANT CHANGE UP (ref 3.8–10.5)
WBC # FLD AUTO: 7.19 K/UL — SIGNIFICANT CHANGE UP (ref 3.8–10.5)

## 2017-01-08 PROCEDURE — 72142 MRI NECK SPINE W/DYE: CPT | Mod: 26

## 2017-01-08 PROCEDURE — 72131 CT LUMBAR SPINE W/O DYE: CPT | Mod: 26

## 2017-01-08 RX ADMIN — Medication 500 MILLIGRAM(S): at 14:52

## 2017-01-08 RX ADMIN — RIVAROXABAN 20 MILLIGRAM(S): KIT at 14:52

## 2017-01-08 RX ADMIN — GABAPENTIN 300 MILLIGRAM(S): 400 CAPSULE ORAL at 21:53

## 2017-01-08 RX ADMIN — GABAPENTIN 300 MILLIGRAM(S): 400 CAPSULE ORAL at 14:52

## 2017-01-08 RX ADMIN — MEROPENEM 200 MILLIGRAM(S): 1 INJECTION INTRAVENOUS at 22:21

## 2017-01-08 RX ADMIN — PANTOPRAZOLE SODIUM 40 MILLIGRAM(S): 20 TABLET, DELAYED RELEASE ORAL at 06:49

## 2017-01-08 RX ADMIN — GABAPENTIN 300 MILLIGRAM(S): 400 CAPSULE ORAL at 05:15

## 2017-01-08 RX ADMIN — NYSTATIN CREAM 1 APPLICATION(S): 100000 CREAM TOPICAL at 17:28

## 2017-01-08 RX ADMIN — OXYCODONE HYDROCHLORIDE 10 MILLIGRAM(S): 5 TABLET ORAL at 00:27

## 2017-01-08 RX ADMIN — OXYCODONE HYDROCHLORIDE 10 MILLIGRAM(S): 5 TABLET ORAL at 13:19

## 2017-01-08 RX ADMIN — OXYCODONE HYDROCHLORIDE 10 MILLIGRAM(S): 5 TABLET ORAL at 13:49

## 2017-01-08 RX ADMIN — OXYCODONE HYDROCHLORIDE 10 MILLIGRAM(S): 5 TABLET ORAL at 00:57

## 2017-01-08 RX ADMIN — MEROPENEM 200 MILLIGRAM(S): 1 INJECTION INTRAVENOUS at 14:51

## 2017-01-08 RX ADMIN — Medication 1 MILLIGRAM(S): at 21:48

## 2017-01-08 RX ADMIN — NYSTATIN CREAM 1 APPLICATION(S): 100000 CREAM TOPICAL at 05:16

## 2017-01-08 RX ADMIN — SIMVASTATIN 20 MILLIGRAM(S): 20 TABLET, FILM COATED ORAL at 21:48

## 2017-01-09 LAB
ANION GAP SERPL CALC-SCNC: 10 MMOL/L — SIGNIFICANT CHANGE UP (ref 5–17)
BUN SERPL-MCNC: 30 MG/DL — HIGH (ref 7–23)
CALCIUM SERPL-MCNC: 8.8 MG/DL — SIGNIFICANT CHANGE UP (ref 8.4–10.5)
CHLORIDE SERPL-SCNC: 111 MMOL/L — HIGH (ref 96–108)
CO2 SERPL-SCNC: 29 MMOL/L — SIGNIFICANT CHANGE UP (ref 22–31)
CREAT SERPL-MCNC: 0.81 MG/DL — SIGNIFICANT CHANGE UP (ref 0.5–1.3)
CRP SERPL-MCNC: 0.24 MG/DL — SIGNIFICANT CHANGE UP (ref 0–0.4)
ERYTHROCYTE [SEDIMENTATION RATE] IN BLOOD: 21 MM/HR — HIGH (ref 0–20)
GLUCOSE SERPL-MCNC: 133 MG/DL — HIGH (ref 70–99)
HCT VFR BLD CALC: 27.1 % — LOW (ref 39–50)
HGB BLD-MCNC: 8.2 G/DL — LOW (ref 13–17)
MCHC RBC-ENTMCNC: 26.8 PG — LOW (ref 27–34)
MCHC RBC-ENTMCNC: 30.3 GM/DL — LOW (ref 32–36)
MCV RBC AUTO: 88.6 FL — SIGNIFICANT CHANGE UP (ref 80–100)
PLATELET # BLD AUTO: 306 K/UL — SIGNIFICANT CHANGE UP (ref 150–400)
POTASSIUM SERPL-MCNC: 3.9 MMOL/L — SIGNIFICANT CHANGE UP (ref 3.5–5.3)
POTASSIUM SERPL-SCNC: 3.9 MMOL/L — SIGNIFICANT CHANGE UP (ref 3.5–5.3)
RBC # BLD: 3.06 M/UL — LOW (ref 4.2–5.8)
RBC # FLD: 17.5 % — HIGH (ref 10.3–14.5)
SODIUM SERPL-SCNC: 150 MMOL/L — HIGH (ref 135–145)
VANCOMYCIN TROUGH SERPL-MCNC: 14.4 UG/ML — SIGNIFICANT CHANGE UP (ref 10–20)
WBC # BLD: 7.6 K/UL — SIGNIFICANT CHANGE UP (ref 3.8–10.5)
WBC # FLD AUTO: 7.6 K/UL — SIGNIFICANT CHANGE UP (ref 3.8–10.5)

## 2017-01-09 PROCEDURE — 99232 SBSQ HOSP IP/OBS MODERATE 35: CPT

## 2017-01-09 RX ORDER — SODIUM CHLORIDE 9 MG/ML
1000 INJECTION, SOLUTION INTRAVENOUS
Qty: 0 | Refills: 0 | Status: DISCONTINUED | OUTPATIENT
Start: 2017-01-09 | End: 2017-01-10

## 2017-01-09 RX ADMIN — GABAPENTIN 300 MILLIGRAM(S): 400 CAPSULE ORAL at 23:15

## 2017-01-09 RX ADMIN — SIMVASTATIN 20 MILLIGRAM(S): 20 TABLET, FILM COATED ORAL at 22:35

## 2017-01-09 RX ADMIN — Medication 1: at 12:44

## 2017-01-09 RX ADMIN — GABAPENTIN 300 MILLIGRAM(S): 400 CAPSULE ORAL at 05:47

## 2017-01-09 RX ADMIN — MEROPENEM 200 MILLIGRAM(S): 1 INJECTION INTRAVENOUS at 13:30

## 2017-01-09 RX ADMIN — NYSTATIN CREAM 1 APPLICATION(S): 100000 CREAM TOPICAL at 05:47

## 2017-01-09 RX ADMIN — OXYCODONE HYDROCHLORIDE 10 MILLIGRAM(S): 5 TABLET ORAL at 10:12

## 2017-01-09 RX ADMIN — MEROPENEM 200 MILLIGRAM(S): 1 INJECTION INTRAVENOUS at 23:47

## 2017-01-09 RX ADMIN — Medication 150 MILLIGRAM(S): at 19:42

## 2017-01-09 RX ADMIN — FENTANYL CITRATE 1 PATCH: 50 INJECTION INTRAVENOUS at 17:39

## 2017-01-09 RX ADMIN — RIVAROXABAN 20 MILLIGRAM(S): KIT at 12:45

## 2017-01-09 RX ADMIN — FENTANYL CITRATE 1 PATCH: 50 INJECTION INTRAVENOUS at 17:41

## 2017-01-09 RX ADMIN — OXYCODONE HYDROCHLORIDE 10 MILLIGRAM(S): 5 TABLET ORAL at 11:00

## 2017-01-09 RX ADMIN — OXYCODONE HYDROCHLORIDE 10 MILLIGRAM(S): 5 TABLET ORAL at 23:51

## 2017-01-09 RX ADMIN — GABAPENTIN 300 MILLIGRAM(S): 400 CAPSULE ORAL at 17:43

## 2017-01-09 RX ADMIN — Medication 500 MILLIGRAM(S): at 12:45

## 2017-01-09 RX ADMIN — Medication 1 MILLIGRAM(S): at 22:36

## 2017-01-09 RX ADMIN — PANTOPRAZOLE SODIUM 40 MILLIGRAM(S): 20 TABLET, DELAYED RELEASE ORAL at 06:24

## 2017-01-09 RX ADMIN — Medication 150 MILLIGRAM(S): at 08:53

## 2017-01-09 RX ADMIN — NYSTATIN CREAM 1 APPLICATION(S): 100000 CREAM TOPICAL at 17:40

## 2017-01-09 RX ADMIN — OXYCODONE HYDROCHLORIDE 10 MILLIGRAM(S): 5 TABLET ORAL at 22:44

## 2017-01-09 RX ADMIN — SODIUM CHLORIDE 50 MILLILITER(S): 9 INJECTION, SOLUTION INTRAVENOUS at 17:43

## 2017-01-09 NOTE — SWALLOW BEDSIDE ASSESSMENT ADULT - SLP PERTINENT HISTORY OF CURRENT PROBLEM
77 y/o M PMH DMII, HTN, spinal stenosis, CVA 20 yrs ago with mild residual L sided weakness, recent complicated admission at [place] for perforated gastric ulcer c/b PEG tube placement, RLE DVT, osteomyelitis/diskitis on vanc/grey via PICC sent in from rehab for R lid ptosis and lateral gaze deviation. Per pt for the past week he has noticed his R eye hasn't been opening.  He also initially had some decreased visual acuity and horizontal double vision which has been improving. No headaches, pain with eye movements, no changes in speech, changes in strength. Seen by neuro at rehab, treated with medrol pack 12/29-1/4 without improvement sent to hospital for MRI. Walked with a walker prior to admission to OSH but since hospitalization has been wheelchair bound and unable to walk due to leg weakness. On exam pt A&Ox3, with R 3rd nerve palsy, ptosis, other cranial nerves intact. 75 y/o M PMH DMII, HTN, spinal stenosis, CVA 20 yrs ago with mild residual L sided weakness, recent complicated admission at [place] for perforated gastric ulcer c/b PEG tube placement, RLE DVT, osteomyelitis/diskitis on vanc/grey via PICC sent in from rehab for R lid ptosis and lateral gaze deviation. Per pt for the past week he has noticed his R eye hasn't been opening.  He also initially had some decreased visual acuity and horizontal double vision which has been improving. No headaches, pain with eye movements, no changes in speech, changes in strength. Seen by neuro at rehab, treated with medrol pack 12/29-1/4 without improvement sent to hospital for MRI. Walked with a walker prior to admission to OSH but since hospitalization has been wheelchair bound and unable to walk due to leg weakness. NIHSS=2. On exam pt A&Ox3, with R 3rd nerve palsy, ptosis, other cranial nerves intact.

## 2017-01-09 NOTE — SWALLOW BEDSIDE ASSESSMENT ADULT - SWALLOW EVAL: PATIENT/FAMILY GOALS STATEMENT
Pt denies h/o dysphagia, does not recall reasoning for PEG placement. Vaguely aware of "digestion issues," aware of MBS and swallowing therapy. Endorses collaboration between SLP @ rehab and @ [facility] re: treatment plan. States he was participating in initially in trials of thick puree, followed by thin liquids, followed by hard solids; was planning to attempt full tray trial prior to being d/izzy to hospital. Pt relayed understanding of rationale for contraindication of subjective PO trials, relayed desire to participate in objective study prior to d/c on 1/10.

## 2017-01-09 NOTE — SWALLOW BEDSIDE ASSESSMENT ADULT - SWALLOW EVAL: DIAGNOSIS
Attempted to see patient for bedside swallow evaluation, pt currently off floor at MRI. This service to reattempt later this date, schedule permitting.
Patient with known h/o dysphagia requiring alternate means of nutrition/hydration via PEG. Subjective PO trials contraindicated at this time. Would recommend objective swallow study to assess candidacy to initiate oral feeding.

## 2017-01-09 NOTE — SWALLOW BEDSIDE ASSESSMENT ADULT - COMMENTS
Per d/c summary from [facility], [facility], was to receive speech and swallow therapy and TPN weaning, however developed pain and swelling in RLE and found to have extensive DVT throughout the RLE. Pt was started on heparin at rehab, tx to hospital on 11/28. GI rx speech and swallow assessment while continuing TPN and pantoprazole. Speech and swallow evaluated on 11/30, rx only PO water, aspiration precautions and MBS, which pt subsequently failed (11/30/16). Speech and swallow continued following throughout the pt’s hospitalization. Pt was not able to tolerate PO progression, therefore, on 12/6 pt received PEG by GI with no complications.     **See Addendum for HC**

## 2017-01-10 VITALS
TEMPERATURE: 98 F | OXYGEN SATURATION: 98 % | DIASTOLIC BLOOD PRESSURE: 80 MMHG | HEIGHT: 62 IN | RESPIRATION RATE: 18 BRPM | WEIGHT: 113.76 LBS | SYSTOLIC BLOOD PRESSURE: 109 MMHG | HEART RATE: 105 BPM

## 2017-01-10 LAB
ANION GAP SERPL CALC-SCNC: 11 MMOL/L — SIGNIFICANT CHANGE UP (ref 5–17)
BUN SERPL-MCNC: 32 MG/DL — HIGH (ref 7–23)
CALCIUM SERPL-MCNC: 8.7 MG/DL — SIGNIFICANT CHANGE UP (ref 8.4–10.5)
CHLORIDE SERPL-SCNC: 110 MMOL/L — HIGH (ref 96–108)
CO2 SERPL-SCNC: 28 MMOL/L — SIGNIFICANT CHANGE UP (ref 22–31)
CREAT SERPL-MCNC: 0.84 MG/DL — SIGNIFICANT CHANGE UP (ref 0.5–1.3)
GLUCOSE SERPL-MCNC: 128 MG/DL — HIGH (ref 70–99)
HCT VFR BLD CALC: 26.6 % — LOW (ref 39–50)
HGB BLD-MCNC: 8 G/DL — LOW (ref 13–17)
MCHC RBC-ENTMCNC: 27 PG — SIGNIFICANT CHANGE UP (ref 27–34)
MCHC RBC-ENTMCNC: 30.1 GM/DL — LOW (ref 32–36)
MCV RBC AUTO: 89.9 FL — SIGNIFICANT CHANGE UP (ref 80–100)
PLATELET # BLD AUTO: 263 K/UL — SIGNIFICANT CHANGE UP (ref 150–400)
POTASSIUM SERPL-MCNC: 3.8 MMOL/L — SIGNIFICANT CHANGE UP (ref 3.5–5.3)
POTASSIUM SERPL-SCNC: 3.8 MMOL/L — SIGNIFICANT CHANGE UP (ref 3.5–5.3)
RBC # BLD: 2.96 M/UL — LOW (ref 4.2–5.8)
RBC # FLD: 17.8 % — HIGH (ref 10.3–14.5)
SODIUM SERPL-SCNC: 149 MMOL/L — HIGH (ref 135–145)
WBC # BLD: 7.72 K/UL — SIGNIFICANT CHANGE UP (ref 3.8–10.5)
WBC # FLD AUTO: 7.72 K/UL — SIGNIFICANT CHANGE UP (ref 3.8–10.5)

## 2017-01-10 PROCEDURE — 74230 X-RAY XM SWLNG FUNCJ C+: CPT | Mod: 26

## 2017-01-10 PROCEDURE — 86140 C-REACTIVE PROTEIN: CPT

## 2017-01-10 PROCEDURE — 72131 CT LUMBAR SPINE W/O DYE: CPT

## 2017-01-10 PROCEDURE — 74230 X-RAY XM SWLNG FUNCJ C+: CPT

## 2017-01-10 PROCEDURE — A9585: CPT

## 2017-01-10 PROCEDURE — 85730 THROMBOPLASTIN TIME PARTIAL: CPT

## 2017-01-10 PROCEDURE — 70498 CT ANGIOGRAPHY NECK: CPT

## 2017-01-10 PROCEDURE — 84443 ASSAY THYROID STIM HORMONE: CPT

## 2017-01-10 PROCEDURE — 92611 MOTION FLUOROSCOPY/SWALLOW: CPT

## 2017-01-10 PROCEDURE — 99232 SBSQ HOSP IP/OBS MODERATE 35: CPT

## 2017-01-10 PROCEDURE — 80202 ASSAY OF VANCOMYCIN: CPT

## 2017-01-10 PROCEDURE — 83036 HEMOGLOBIN GLYCOSYLATED A1C: CPT

## 2017-01-10 PROCEDURE — 85610 PROTHROMBIN TIME: CPT

## 2017-01-10 PROCEDURE — 99285 EMERGENCY DEPT VISIT HI MDM: CPT | Mod: 25

## 2017-01-10 PROCEDURE — 72142 MRI NECK SPINE W/DYE: CPT

## 2017-01-10 PROCEDURE — 93005 ELECTROCARDIOGRAM TRACING: CPT

## 2017-01-10 PROCEDURE — 85652 RBC SED RATE AUTOMATED: CPT

## 2017-01-10 PROCEDURE — 84100 ASSAY OF PHOSPHORUS: CPT

## 2017-01-10 PROCEDURE — 92610 EVALUATE SWALLOWING FUNCTION: CPT

## 2017-01-10 PROCEDURE — 72147 MRI CHEST SPINE W/DYE: CPT

## 2017-01-10 PROCEDURE — 72149 MRI LUMBAR SPINE W/DYE: CPT

## 2017-01-10 PROCEDURE — 70496 CT ANGIOGRAPHY HEAD: CPT

## 2017-01-10 PROCEDURE — 80053 COMPREHEN METABOLIC PANEL: CPT

## 2017-01-10 PROCEDURE — 70450 CT HEAD/BRAIN W/O DYE: CPT

## 2017-01-10 PROCEDURE — 80048 BASIC METABOLIC PNL TOTAL CA: CPT

## 2017-01-10 PROCEDURE — 70553 MRI BRAIN STEM W/O & W/DYE: CPT

## 2017-01-10 PROCEDURE — 97162 PT EVAL MOD COMPLEX 30 MIN: CPT

## 2017-01-10 PROCEDURE — 84484 ASSAY OF TROPONIN QUANT: CPT

## 2017-01-10 PROCEDURE — 83735 ASSAY OF MAGNESIUM: CPT

## 2017-01-10 PROCEDURE — 85027 COMPLETE CBC AUTOMATED: CPT

## 2017-01-10 PROCEDURE — 71045 X-RAY EXAM CHEST 1 VIEW: CPT

## 2017-01-10 PROCEDURE — 80061 LIPID PANEL: CPT

## 2017-01-10 RX ORDER — GABAPENTIN 400 MG/1
1 CAPSULE ORAL
Qty: 0 | Refills: 0 | COMMUNITY

## 2017-01-10 RX ORDER — ACETAMINOPHEN 500 MG
1 TABLET ORAL
Qty: 0 | Refills: 0 | COMMUNITY

## 2017-01-10 RX ORDER — OMEPRAZOLE 10 MG/1
1 CAPSULE, DELAYED RELEASE ORAL
Qty: 0 | Refills: 0 | COMMUNITY

## 2017-01-10 RX ORDER — INSULIN LISPRO 100/ML
0 VIAL (ML) SUBCUTANEOUS
Qty: 0 | Refills: 0 | COMMUNITY
Start: 2017-01-10

## 2017-01-10 RX ORDER — GABAPENTIN 400 MG/1
6 CAPSULE ORAL
Qty: 0 | Refills: 0 | COMMUNITY
Start: 2017-01-10

## 2017-01-10 RX ORDER — FENTANYL CITRATE 50 UG/ML
1 INJECTION INTRAVENOUS
Qty: 0 | Refills: 0 | COMMUNITY

## 2017-01-10 RX ORDER — SIMVASTATIN 20 MG/1
1 TABLET, FILM COATED ORAL
Qty: 0 | Refills: 0 | COMMUNITY
Start: 2017-01-10

## 2017-01-10 RX ORDER — RIVAROXABAN 15 MG-20MG
1 KIT ORAL
Qty: 0 | Refills: 0 | COMMUNITY
Start: 2017-01-10

## 2017-01-10 RX ORDER — FENTANYL CITRATE 50 UG/ML
1 INJECTION INTRAVENOUS
Qty: 0 | Refills: 0 | COMMUNITY
Start: 2017-01-10

## 2017-01-10 RX ORDER — SIMVASTATIN 20 MG/1
1 TABLET, FILM COATED ORAL
Qty: 0 | Refills: 0 | COMMUNITY

## 2017-01-10 RX ORDER — NYSTATIN CREAM 100000 [USP'U]/G
1 CREAM TOPICAL
Qty: 0 | Refills: 0 | COMMUNITY

## 2017-01-10 RX ORDER — OXYCODONE HYDROCHLORIDE 5 MG/1
1 TABLET ORAL
Qty: 0 | Refills: 0 | COMMUNITY

## 2017-01-10 RX ORDER — MEROPENEM 1 G/30ML
2000 INJECTION INTRAVENOUS
Qty: 0 | Refills: 0 | COMMUNITY
Start: 2017-01-10

## 2017-01-10 RX ORDER — RIVAROXABAN 15 MG-20MG
1 KIT ORAL
Qty: 0 | Refills: 0 | COMMUNITY

## 2017-01-10 RX ORDER — INSULIN LISPRO 100/ML
0 VIAL (ML) SUBCUTANEOUS
Qty: 0 | Refills: 0 | COMMUNITY

## 2017-01-10 RX ORDER — ASCORBIC ACID 60 MG
1 TABLET,CHEWABLE ORAL
Qty: 0 | Refills: 0 | COMMUNITY
Start: 2017-01-10

## 2017-01-10 RX ORDER — VANCOMYCIN HCL 1 G
750 VIAL (EA) INTRAVENOUS
Qty: 0 | Refills: 0 | COMMUNITY
Start: 2017-01-10

## 2017-01-10 RX ORDER — OXYCODONE HYDROCHLORIDE 5 MG/1
1 TABLET ORAL
Qty: 0 | Refills: 0 | COMMUNITY
Start: 2017-01-10

## 2017-01-10 RX ORDER — NYSTATIN CREAM 100000 [USP'U]/G
1 CREAM TOPICAL
Qty: 0 | Refills: 0 | COMMUNITY
Start: 2017-01-10

## 2017-01-10 RX ADMIN — MEROPENEM 200 MILLIGRAM(S): 1 INJECTION INTRAVENOUS at 11:57

## 2017-01-10 RX ADMIN — OXYCODONE HYDROCHLORIDE 10 MILLIGRAM(S): 5 TABLET ORAL at 11:40

## 2017-01-10 RX ADMIN — OXYCODONE HYDROCHLORIDE 10 MILLIGRAM(S): 5 TABLET ORAL at 11:03

## 2017-01-10 RX ADMIN — PANTOPRAZOLE SODIUM 40 MILLIGRAM(S): 20 TABLET, DELAYED RELEASE ORAL at 06:11

## 2017-01-10 RX ADMIN — RIVAROXABAN 20 MILLIGRAM(S): KIT at 11:03

## 2017-01-10 RX ADMIN — Medication 500 MILLIGRAM(S): at 11:03

## 2017-01-10 RX ADMIN — Medication 1: at 11:57

## 2017-01-10 RX ADMIN — Medication 150 MILLIGRAM(S): at 10:55

## 2017-01-10 RX ADMIN — GABAPENTIN 300 MILLIGRAM(S): 400 CAPSULE ORAL at 05:26

## 2017-01-10 RX ADMIN — GABAPENTIN 300 MILLIGRAM(S): 400 CAPSULE ORAL at 15:20

## 2017-01-10 RX ADMIN — NYSTATIN CREAM 1 APPLICATION(S): 100000 CREAM TOPICAL at 05:26

## 2017-01-10 NOTE — SWALLOW VFSS/MBS ASSESSMENT ADULT - ROSENBEK'S PENETRATION ASPIRATION SCALE
(1) no aspiration, contrast does not enter airway (2) contrast enters airway, remains above the vocal cords, no residue remains (penetration) (3) contrast remains above the vocal cords, visible residue remains (penetration)

## 2017-01-10 NOTE — SWALLOW VFSS/MBS ASSESSMENT ADULT - RECOMMENDED CONSISTENCY
NPO, with non-oral nutrition/hydration/medications with therapeutic feeding trials of honey thickened liquids by SLP ONLY with feeding strategies as below.

## 2017-01-10 NOTE — SWALLOW VFSS/MBS ASSESSMENT ADULT - COMMENTS
Per d/c summary from [facility], pt [facility], was to receive speech and swallow therapy and TPN weaning, however developed pain and swelling in RLE and found to have extensive DVT throughout the RLE. Pt was started on heparin at rehab, tx to hospital on 11/28. GI rx speech and swallow assessment while continuing TPN and pantoprazole. Speech and swallow evaluated on 11/30, rx only PO water, aspiration precautions and MBS, which pt subsequently failed (11/30/16). Speech and swallow continued following throughout the pt’s hospitalization. Pt was not able to tolerate PO progression, therefore, on 12/6 pt received PEG by GI with no complications.

## 2017-01-10 NOTE — SWALLOW VFSS/MBS ASSESSMENT ADULT - LARYNGEAL PENETRATION AFTER THE SWALLOW - SILENT
Mild/Noted post swallow with honey thickened liquid wash down; appeared to be thick puree intermixed with honey thickened liquids./Moderate may also be a combination of premature spillage from next trial on thin liquids./Mild

## 2017-01-10 NOTE — SWALLOW VFSS/MBS ASSESSMENT ADULT - NS SWALLOW VFSS REC ASPIR MON
pneumonia/upper respiratory infection/*If evident (especially wet vocal quality), report to MD immediately and d/c therapeutic feeding trial/cough/gurgly voice/throat clearing/change of breathing pattern/fever

## 2017-01-10 NOTE — SWALLOW VFSS/MBS ASSESSMENT ADULT - RECOMMENDED FEEDING/EATING TECHNIQUES
maintain upright posture during/after eating for 30 mins/oral hygiene/provide rest periods between swallows/small sips/bites/allow for swallow between intakes/check mouth frequently for oral residue/pocketing/position upright (90 degrees)

## 2017-01-10 NOTE — SWALLOW VFSS/MBS ASSESSMENT ADULT - SLP PERTINENT HISTORY OF CURRENT PROBLEM
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 Per pt for the past week he has noticed his R eye hasn't been opening.  He also initially had some decreased visual acuity and horizontal double vision which has been improving. No headaches, pain with eye movements, no changes in speech, changes in strength. Seen by neuro at rehab, treated with medrol pack 12/29-1/4 without improvement sent to hospital for MRI. Walked with a walker prior to admission to OSH but since hospitalization has been wheelchair bound and unable to walk due to leg weakness. NIHSS=2. On exam pt A&Ox3, with R 3rd nerve palsy, ptosis, other cranial nerves intact.

## 2017-01-10 NOTE — SWALLOW VFSS/MBS ASSESSMENT ADULT - DIAGNOSTIC IMPRESSIONS
Pt presents with pharyngeal stage dysphagia primarily characterized by significant pharyngeal residue with intermittent laryngeal penetration with purees,  honey thickened liquid wasd, nectar thickened  and thin liquids.  Aspiration was not evident with any consistency presented however Pt deemed to be at risk for aspiration given pharyngeal residue with Pt report of increased oropharyngeal secretions prior to study.  Laryngeal penetration was not evident in initial isolated trials of honey thickened liquids.  Repeat swallows reduced pharyngeal residue but did not effectively eliminate residue.  Chin tuck and head turn postures were ineffective in eliminating laryngeal penetration.  Disorders include: reduced BOT to posterior pharyngeal wall contact, delay in trigger of the swallow reflex, reduced epiglottic retroflexion, reduced laryngeal closure, reduced pharyngeal contractility, and reduced supraglottic sensation.  In addition, presence of cervical osteophytes,  reduced epiglottic retroflexion and suspected prominent cricopharyngeus vs other with slight intermittent retention noted in the proximal esophagus.  Pt also c/o secretions in throat which he independently expelled via oral cavity prior to exam with wet vocal quality perceived. Pt presents with pharyngeal stage dysphagia primarily characterized by significant pharyngeal residue with intermittent laryngeal penetration with purees,  honey thickened liquid wash, nectar thickened  and thin liquids.  Aspiration was not evident with any consistency presented however Pt deemed to be at risk for aspiration given pharyngeal residue with Pt report of increased oropharyngeal secretions prior to study.  Laryngeal penetration was not evident in initial isolated trials of honey thickened liquids.  Repeat swallows reduced pharyngeal residue but did not effectively eliminate residue.  Chin tuck and head turn postures were ineffective in eliminating laryngeal penetration.  Disorders include: reduced BOT to posterior pharyngeal wall contact, delay in trigger of the swallow reflex, reduced epiglottic retroflexion, reduced laryngeal closure, reduced pharyngeal contractility, and reduced supraglottic sensation.  In addition, presence of cervical osteophytes,  reduced epiglottic retroflexion and suspected prominent cricopharyngeus vs other with slight intermittent retention noted in the proximal esophagus.  Pt also c/o secretions in throat which he independently expelled via oral cavity prior to exam with wet vocal quality perceived.

## 2017-01-10 NOTE — SWALLOW VFSS/MBS ASSESSMENT ADULT - ORAL PHASE
within functional limits/Reduced anterior - posterior transport within functional limits mildly delayed/Reduced anterior - posterior transport/within functional limits Within functional limits Reduced anterior - posterior transport/Incomplete tongue to palate contact/Uncontrolled bolus / spillover in sheila-pharynx

## 2017-01-10 NOTE — SWALLOW VFSS/MBS ASSESSMENT ADULT - ADDITIONAL INFORMATION
Presence of cervical osteophytes; reduced epiglottic retroflexion; suspected prominent cricopharyngeus vs other; slight intermittent retention in the proximal esophagus;  wet vocal quality prior to exam with Pt c/o secretions in throat which he independently expelled via oral cavity.

## 2017-02-21 ENCOUNTER — OUTPATIENT (OUTPATIENT)
Dept: OUTPATIENT SERVICES | Facility: HOSPITAL | Age: 77
LOS: 1 days | End: 2017-02-21
Payer: COMMERCIAL

## 2017-02-21 DIAGNOSIS — K27.5 CHRONIC OR UNSPECIFIED PEPTIC ULCER, SITE UNSPECIFIED, WITH PERFORATION: Chronic | ICD-10-CM

## 2017-02-21 DIAGNOSIS — Z90.89 ACQUIRED ABSENCE OF OTHER ORGANS: Chronic | ICD-10-CM

## 2017-02-21 PROCEDURE — G8997: CPT | Mod: CI

## 2017-02-21 PROCEDURE — 74230 X-RAY XM SWLNG FUNCJ C+: CPT | Mod: 26

## 2017-02-21 PROCEDURE — 92611 MOTION FLUOROSCOPY/SWALLOW: CPT

## 2017-02-21 PROCEDURE — 74230 X-RAY XM SWLNG FUNCJ C+: CPT

## 2017-02-21 PROCEDURE — G8998: CPT | Mod: CI

## 2017-02-21 PROCEDURE — G8996: CPT | Mod: CI

## 2019-09-03 NOTE — PHYSICAL THERAPY INITIAL EVALUATION ADULT - PRECAUTIONS/LIMITATIONS, REHAB EVAL
ID following for ? discitis & determine course of antibiotics. CT HEAD: No acute intracranial hemorrhage, mass effect or evidence of acute territorial infarct. Small chronic right posterior inferior cerebellar infarct. CTA BRAIN/Neck - no acute findings. [>50% of Time Spent on Counseling and Coordination of Care for  ___] : Greater than 50% of the encounter time was spent on counseling and coordination of care for [unfilled] [Time Spent: ___ minutes] : I have spent [unfilled] minutes of face to face time with the patient

## 2020-12-05 NOTE — ED ADULT TRIAGE NOTE - MEANS OF ARRIVAL
Prescription approved per [facility] according to the [facility] Refill Protocol.  Ariadna Arreola RN     stretcher

## 2022-04-20 PROBLEM — I10 ESSENTIAL (PRIMARY) HYPERTENSION: Chronic | Status: ACTIVE | Noted: 2017-01-05

## 2022-04-20 PROBLEM — N40.0 BENIGN PROSTATIC HYPERPLASIA WITHOUT LOWER URINARY TRACT SYMPTOMS: Chronic | Status: ACTIVE | Noted: 2017-01-05

## 2022-04-20 PROBLEM — M48.00 SPINAL STENOSIS, SITE UNSPECIFIED: Chronic | Status: ACTIVE | Noted: 2017-01-05

## 2022-04-20 PROBLEM — I82.411 ACUTE EMBOLISM AND THROMBOSIS OF RIGHT FEMORAL VEIN: Chronic | Status: ACTIVE | Noted: 2017-01-05

## 2022-04-20 PROBLEM — I63.9 CEREBRAL INFARCTION, UNSPECIFIED: Chronic | Status: ACTIVE | Noted: 2017-01-05

## 2022-04-20 PROBLEM — K25.1 ACUTE GASTRIC ULCER WITH PERFORATION: Chronic | Status: ACTIVE | Noted: 2017-01-05

## 2024-02-07 NOTE — PHYSICAL THERAPY INITIAL EVALUATION ADULT - ASR WT BEARING STATUS EVAL
Discharge instructions discussed with caregiver. All questions answered. Caregiver verbalized understanding.     no weight-bearing restrictions

## 2024-08-19 NOTE — H&P ADULT. - MUSCULOSKELETAL
Low back pain, chronic in nature 15 years. 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